# Patient Record
Sex: FEMALE | Race: BLACK OR AFRICAN AMERICAN | NOT HISPANIC OR LATINO
[De-identification: names, ages, dates, MRNs, and addresses within clinical notes are randomized per-mention and may not be internally consistent; named-entity substitution may affect disease eponyms.]

---

## 2018-05-21 ENCOUNTER — APPOINTMENT (OUTPATIENT)
Dept: OBGYN | Facility: CLINIC | Age: 58
End: 2018-05-21
Payer: COMMERCIAL

## 2018-05-21 VITALS
HEIGHT: 61 IN | SYSTOLIC BLOOD PRESSURE: 134 MMHG | HEART RATE: 75 BPM | DIASTOLIC BLOOD PRESSURE: 78 MMHG | WEIGHT: 112 LBS | BODY MASS INDEX: 21.14 KG/M2

## 2018-05-21 DIAGNOSIS — Z78.9 OTHER SPECIFIED HEALTH STATUS: ICD-10-CM

## 2018-05-21 DIAGNOSIS — Z82.49 FAMILY HISTORY OF ISCHEMIC HEART DISEASE AND OTHER DISEASES OF THE CIRCULATORY SYSTEM: ICD-10-CM

## 2018-05-21 DIAGNOSIS — Z00.00 ENCOUNTER FOR GENERAL ADULT MEDICAL EXAMINATION W/OUT ABNORMAL FINDINGS: ICD-10-CM

## 2018-05-21 DIAGNOSIS — Z83.3 FAMILY HISTORY OF DIABETES MELLITUS: ICD-10-CM

## 2018-05-21 DIAGNOSIS — N20.0 CALCULUS OF KIDNEY: ICD-10-CM

## 2018-05-21 DIAGNOSIS — Z80.9 FAMILY HISTORY OF MALIGNANT NEOPLASM, UNSPECIFIED: ICD-10-CM

## 2018-05-21 PROCEDURE — 99396 PREV VISIT EST AGE 40-64: CPT

## 2018-06-01 LAB
C TRACH RRNA SPEC QL NAA+PROBE: NOT DETECTED
CYTOLOGY CVX/VAG DOC THIN PREP: NORMAL
HPV HIGH+LOW RISK DNA PNL CVX: NOT DETECTED
N GONORRHOEA RRNA SPEC QL NAA+PROBE: NOT DETECTED
SOURCE TP AMPLIFICATION: NORMAL

## 2018-06-13 ENCOUNTER — OTHER (OUTPATIENT)
Age: 58
End: 2018-06-13

## 2018-06-18 ENCOUNTER — APPOINTMENT (OUTPATIENT)
Dept: OBGYN | Facility: CLINIC | Age: 58
End: 2018-06-18
Payer: COMMERCIAL

## 2018-06-18 PROCEDURE — 99213 OFFICE O/P EST LOW 20 MIN: CPT

## 2018-06-21 ENCOUNTER — RECORD ABSTRACTING (OUTPATIENT)
Age: 58
End: 2018-06-21

## 2018-06-21 ENCOUNTER — APPOINTMENT (OUTPATIENT)
Dept: CARDIOLOGY | Facility: CLINIC | Age: 58
End: 2018-06-21
Payer: COMMERCIAL

## 2018-06-21 VITALS
DIASTOLIC BLOOD PRESSURE: 74 MMHG | HEIGHT: 61 IN | HEART RATE: 66 BPM | WEIGHT: 112 LBS | BODY MASS INDEX: 21.14 KG/M2 | SYSTOLIC BLOOD PRESSURE: 124 MMHG | RESPIRATION RATE: 12 BRPM

## 2018-06-21 DIAGNOSIS — R01.1 CARDIAC MURMUR, UNSPECIFIED: ICD-10-CM

## 2018-06-21 DIAGNOSIS — Z82.3 FAMILY HISTORY OF STROKE: ICD-10-CM

## 2018-06-21 PROCEDURE — 93000 ELECTROCARDIOGRAM COMPLETE: CPT

## 2018-06-21 PROCEDURE — 99244 OFF/OP CNSLTJ NEW/EST MOD 40: CPT

## 2018-12-10 ENCOUNTER — APPOINTMENT (OUTPATIENT)
Dept: CARDIOLOGY | Facility: CLINIC | Age: 58
End: 2018-12-10

## 2018-12-13 ENCOUNTER — APPOINTMENT (OUTPATIENT)
Dept: CARDIOLOGY | Facility: CLINIC | Age: 58
End: 2018-12-13
Payer: COMMERCIAL

## 2018-12-13 VITALS
OXYGEN SATURATION: 100 % | DIASTOLIC BLOOD PRESSURE: 82 MMHG | RESPIRATION RATE: 12 BRPM | HEART RATE: 62 BPM | HEIGHT: 61 IN | SYSTOLIC BLOOD PRESSURE: 178 MMHG | BODY MASS INDEX: 21.34 KG/M2 | WEIGHT: 113 LBS

## 2018-12-13 DIAGNOSIS — Z87.898 PERSONAL HISTORY OF OTHER SPECIFIED CONDITIONS: ICD-10-CM

## 2018-12-13 PROCEDURE — 99214 OFFICE O/P EST MOD 30 MIN: CPT

## 2018-12-13 PROCEDURE — 93000 ELECTROCARDIOGRAM COMPLETE: CPT

## 2018-12-13 NOTE — PHYSICAL EXAM
[General Appearance - In No Acute Distress] : no acute distress [Normal Conjunctiva] : the conjunctiva exhibited no abnormalities [Normal Oral Mucosa] : normal oral mucosa [Auscultation Breath Sounds / Voice Sounds] : lungs were clear to auscultation bilaterally [Normal Rate] : normal [Rhythm Regular] : regular [Normal S1] : normal S1 [Normal S2] : normal S2 [I] : a grade 1 [Abdomen Soft] : soft [Abdomen Tenderness] : non-tender [Abnormal Walk] : normal gait [Nail Clubbing] : no clubbing of the fingernails [Cyanosis, Localized] : no localized cyanosis [Skin Color & Pigmentation] : normal skin color and pigmentation [Oriented To Time, Place, And Person] : oriented to person, place, and time [Affect] : the affect was normal [FreeTextEntry1] : No JVD, no carotid bruits. [S3] : no S3 [S4] : no S4

## 2018-12-13 NOTE — ASSESSMENT
[FreeTextEntry1] : EKG: Sinus rhythm with no significant ST or T wave changes.\par \par 58-year-old female with a past medical history of moderate mitral and tricuspid regurgitation with mild pulmonary hypertension, dyslipidemia, family history of stroke who presents for followup evaluation. Patient remains asymptomatic at this time. She did not yet have repeat echocardiogram or the carotid I have suggested but will have them done. Blood pressures is a bit elevated here today but she says it has never been high and was not high when she was here previously. It should be followed at home.

## 2018-12-13 NOTE — HISTORY OF PRESENT ILLNESS
[FreeTextEntry1] : Patient returns to the office today feeling well and offers no complaints. She is planning to retire in a week. She remains active although she does no concerted exercise. She has no limitations in her activity.   Patient denies chest pain, shortness of breath, palpitations, orthopnea, presyncope, syncope.

## 2018-12-13 NOTE — DISCUSSION/SUMMARY
[FreeTextEntry1] : 1. Check echocardiogram to reevaluate her valvular disease.\par 2. Check carotid Doppler given family history of stroke and dyslipidemia.\par 3. Monitor BP at home, keep a log and bring to f/u.\par 4. Continue simvastatin for dyslipidemia.\par 5. Patient is encouraged to exercise at least 30 minutes a day everyday of the week.\par 6. Will discuss results of testing over the phone and review her blood pressures at that time with planned followup in the office in 6 months.

## 2019-01-10 ENCOUNTER — APPOINTMENT (OUTPATIENT)
Dept: OBGYN | Facility: CLINIC | Age: 59
End: 2019-01-10

## 2019-01-26 ENCOUNTER — APPOINTMENT (OUTPATIENT)
Dept: CARDIOLOGY | Facility: CLINIC | Age: 59
End: 2019-01-26
Payer: COMMERCIAL

## 2019-01-26 PROCEDURE — 93880 EXTRACRANIAL BILAT STUDY: CPT

## 2019-01-26 PROCEDURE — 93306 TTE W/DOPPLER COMPLETE: CPT

## 2019-06-04 ENCOUNTER — APPOINTMENT (OUTPATIENT)
Dept: CARDIOLOGY | Facility: CLINIC | Age: 59
End: 2019-06-04
Payer: COMMERCIAL

## 2019-06-04 VITALS
HEIGHT: 61 IN | WEIGHT: 113 LBS | SYSTOLIC BLOOD PRESSURE: 157 MMHG | BODY MASS INDEX: 21.34 KG/M2 | DIASTOLIC BLOOD PRESSURE: 77 MMHG | HEART RATE: 72 BPM | RESPIRATION RATE: 14 BRPM | OXYGEN SATURATION: 100 %

## 2019-06-04 VITALS — DIASTOLIC BLOOD PRESSURE: 78 MMHG | SYSTOLIC BLOOD PRESSURE: 130 MMHG

## 2019-06-04 PROCEDURE — 99214 OFFICE O/P EST MOD 30 MIN: CPT

## 2019-06-04 PROCEDURE — 93000 ELECTROCARDIOGRAM COMPLETE: CPT

## 2019-06-04 RX ORDER — SIMVASTATIN 10 MG/1
10 TABLET, FILM COATED ORAL
Refills: 0 | Status: DISCONTINUED | COMMUNITY
End: 2019-06-04

## 2019-06-04 RX ORDER — CICLOPIROX OLAMINE 7.7 MG/ML
0.77 SUSPENSION TOPICAL
Qty: 60 | Refills: 0 | Status: DISCONTINUED | COMMUNITY
Start: 2018-11-28 | End: 2019-06-04

## 2019-06-04 NOTE — PHYSICAL EXAM
[General Appearance - In No Acute Distress] : no acute distress [Normal Conjunctiva] : the conjunctiva exhibited no abnormalities [Auscultation Breath Sounds / Voice Sounds] : lungs were clear to auscultation bilaterally [Normal Oral Mucosa] : normal oral mucosa [Rhythm Regular] : regular [Normal Rate] : normal [Normal S1] : normal S1 [Normal S2] : normal S2 [I] : a grade 1 [Abdomen Soft] : soft [Abdomen Tenderness] : non-tender [Cyanosis, Localized] : no localized cyanosis [Abnormal Walk] : normal gait [Nail Clubbing] : no clubbing of the fingernails [Oriented To Time, Place, And Person] : oriented to person, place, and time [Skin Color & Pigmentation] : normal skin color and pigmentation [Affect] : the affect was normal [S4] : no S4 [S3] : no S3 [FreeTextEntry1] : No JVD, no carotid bruits.

## 2019-06-04 NOTE — DISCUSSION/SUMMARY
[FreeTextEntry1] : 1. No additional cardiac testing at this time.\par 2. Continue simvastatin at increased dose for dyslipidemia.\par 3. Monitor BP at home, keep a log and bring to f/u. Will not add any medication for now.\par 4. Encourage her to improve her diet habits.\par 5. Repeat blood work in 3 months.\par 6. Patient is encouraged to exercise at least 30 minutes a day everyday of the week.\par 7. Follow up here in four months.\par

## 2019-06-04 NOTE — ASSESSMENT
[FreeTextEntry1] : EKG: Sinus rhythm with no significant ST or T wave changes.\par \par Echocardiogram January 26, 2019 demonstrated left ventricle normal in size and function with ejection fraction of 55-60%. Moderate mitral regurgitation was noted with mild tricuspid regurgitation. No pulmonary attention seen. Left atrium is borderline dilated.\par \par Carotid Dopplers January 26, 2019 demonstrated mild plaque on the left, minimal on the right at the bulbs with no significant stenosis bilaterally.\par \par 58-year-old female with a past medical history of moderate mitral and tricuspid regurgitation with mild pulmonary hypertension, dyslipidemia, family history of stroke who presents for followup evaluation. The patient remains asymptomatic. Echocardiogram shows stable moderate regurgitation with no evidence of significant pulmonary hypertension and only borderline dilated left atrium. Carotid Doppler is minimal to mild. I did increase her simvastatin at that time however her lipids have a little bit. She blames this on very poor diet. She would like to try and make some or changes before considering additional medication. Blood pressure was initially elevated today but came down and she says has been controlled in other offices. She needs to get a new monitor and check more at home.

## 2019-06-04 NOTE — HISTORY OF PRESENT ILLNESS
[FreeTextEntry1] : Patient returns to office today feeling very well and offering no complaints. She has been checking her blood pressures at home with a wrist monitor. However she does not feel the monitor is accurate and has planned to get a new monitor. She continues to be very active exercising regularly without any physical limitations. Patient denies chest pain, shortness of breath, palpitations, orthopnea, presyncope, syncope.

## 2019-06-24 ENCOUNTER — APPOINTMENT (OUTPATIENT)
Dept: OBGYN | Facility: CLINIC | Age: 59
End: 2019-06-24
Payer: COMMERCIAL

## 2019-06-24 VITALS
DIASTOLIC BLOOD PRESSURE: 78 MMHG | HEIGHT: 61 IN | HEART RATE: 78 BPM | SYSTOLIC BLOOD PRESSURE: 132 MMHG | WEIGHT: 111 LBS | BODY MASS INDEX: 20.96 KG/M2

## 2019-06-24 DIAGNOSIS — N95.2 POSTMENOPAUSAL ATROPHIC VAGINITIS: ICD-10-CM

## 2019-06-24 DIAGNOSIS — R23.2 FLUSHING: ICD-10-CM

## 2019-06-24 PROCEDURE — 99214 OFFICE O/P EST MOD 30 MIN: CPT

## 2019-06-24 NOTE — PHYSICAL EXAM
[Awake] : awake [Alert] : alert [Acute Distress] : no acute distress [Mass] : breast mass [Nipple Discharge] : no nipple discharge [Axillary LAD] : no axillary lymphadenopathy [Breast Hypertrophy Of The Right Breast] : hypertrophy [Soft] : soft [Tender] : non tender [Oriented x3] : oriented to person, place, and time [Normal] : urethra [Atrophy] : atrophy [Cystocele] : no cystocele [Discharge] : no discharge [Dry Mucosa] : dry mucosa [No Bleeding] : there was no active vaginal bleeding [Absent] : absent [Uterine Adnexae] : were not tender and not enlarged

## 2019-06-24 NOTE — REVIEW OF SYSTEMS
[Discharge] : no discharge [Chest Pain] : no chest pain [Dec Hearing] : no hearing loss [Dyspnea] : no shortness of breath [Abdominal Pain] : no abdominal pain [Pelvic Pain] : no pelvic pain [Vomiting] : no vomiting [Frequency] : no frequency [Arthralgias] : no arthralgias [Breast Pain] : no breast pain [Headache] : no headache [Anxiety] : no anxiety [Deepening Voice] : no deepening of the voice [Easy Bleeding] : does not bleed easily [Nl] : Hematologic/Lymphatic

## 2019-06-24 NOTE — HISTORY OF PRESENT ILLNESS
[Hot Flashes] : hot flashes [Night Sweats] : night sweats [2/10] : described as 2/10 in severity [Headache] : no headache [Weight Change] : no weight change [Fatigue] : no fatigue [Irritability] : no irritability [Forgetfulness] : no forgetfulness [Loss of Libido] : no loss of libido [Depression] : no depression [Anxiety] : no anxiety [Hot Environment] : exacerbated by hot environment [Relaxation] : relieved by relaxation [Cool Environment] : relieved by cool environment

## 2019-06-26 LAB
C TRACH RRNA SPEC QL NAA+PROBE: NOT DETECTED
HPV HIGH+LOW RISK DNA PNL CVX: NOT DETECTED
N GONORRHOEA RRNA SPEC QL NAA+PROBE: NOT DETECTED
SOURCE TP AMPLIFICATION: NORMAL

## 2019-07-01 LAB — CYTOLOGY CVX/VAG DOC THIN PREP: NORMAL

## 2019-08-04 ENCOUNTER — FORM ENCOUNTER (OUTPATIENT)
Age: 59
End: 2019-08-04

## 2019-08-05 ENCOUNTER — APPOINTMENT (OUTPATIENT)
Dept: MAMMOGRAPHY | Facility: CLINIC | Age: 59
End: 2019-08-05
Payer: COMMERCIAL

## 2019-08-05 ENCOUNTER — OUTPATIENT (OUTPATIENT)
Dept: OUTPATIENT SERVICES | Facility: HOSPITAL | Age: 59
LOS: 1 days | End: 2019-08-05
Payer: COMMERCIAL

## 2019-08-05 DIAGNOSIS — Z12.31 ENCOUNTER FOR SCREENING MAMMOGRAM FOR MALIGNANT NEOPLASM OF BREAST: ICD-10-CM

## 2019-08-05 PROCEDURE — 77067 SCR MAMMO BI INCL CAD: CPT

## 2019-08-05 PROCEDURE — 77067 SCR MAMMO BI INCL CAD: CPT | Mod: 26

## 2019-08-05 PROCEDURE — 77063 BREAST TOMOSYNTHESIS BI: CPT

## 2019-08-05 PROCEDURE — 77063 BREAST TOMOSYNTHESIS BI: CPT | Mod: 26

## 2019-10-07 ENCOUNTER — APPOINTMENT (OUTPATIENT)
Dept: CARDIOLOGY | Facility: CLINIC | Age: 59
End: 2019-10-07
Payer: COMMERCIAL

## 2019-10-07 ENCOUNTER — NON-APPOINTMENT (OUTPATIENT)
Age: 59
End: 2019-10-07

## 2019-10-07 VITALS
BODY MASS INDEX: 21.52 KG/M2 | RESPIRATION RATE: 16 BRPM | HEIGHT: 61 IN | HEART RATE: 73 BPM | SYSTOLIC BLOOD PRESSURE: 161 MMHG | DIASTOLIC BLOOD PRESSURE: 83 MMHG | WEIGHT: 114 LBS

## 2019-10-07 DIAGNOSIS — I27.20 PULMONARY HYPERTENSION, UNSPECIFIED: ICD-10-CM

## 2019-10-07 PROCEDURE — 93000 ELECTROCARDIOGRAM COMPLETE: CPT

## 2019-10-07 PROCEDURE — 99214 OFFICE O/P EST MOD 30 MIN: CPT

## 2019-10-07 NOTE — HISTORY OF PRESENT ILLNESS
[FreeTextEntry1] : Patient comes to the office today feeling generally well. She is complaining of rare palpitations occurring at night when she lays on her left side. They are brief and resolved on their own and not associated with any other symptoms. She has been exercising without physical limitation. Blood pressures have ranged from the 110s to 120s over 70s to 80s at home. Patient denies chest pain, shortness of breath, orthopnea, presyncope, syncope.

## 2019-10-07 NOTE — ASSESSMENT
[FreeTextEntry1] : EKG: Sinus rhythm with mild nonspecific T wave changes.\par \par Echocardiogram January 26, 2019 demonstrated left ventricle normal in size and function with ejection fraction of 55-60%. Moderate mitral regurgitation was noted with mild tricuspid regurgitation. No pulmonary attention seen. Left atrium is borderline dilated.\par \par Carotid Dopplers January 26, 2019 demonstrated mild plaque on the left, minimal on the right at the bulbs with no significant stenosis bilaterally.\par \par 58-year-old female with a past medical history of moderate mitral and tricuspid regurgitation, dyslipidemia, family history of stroke who presents for followup evaluation. Patient is having some intermittent palpitations but he seemed to not be very significant or frequent at this time. She is otherwise asymptomatic and doing well. No evidence of heart failure. She is exercising without issue and maintains a healthy weak. Lipids have improved back in June on the increased dose of simvastatin. Blood pressures appear to be controlled at home.

## 2019-10-07 NOTE — PHYSICAL EXAM
[General Appearance - In No Acute Distress] : no acute distress [Normal Conjunctiva] : the conjunctiva exhibited no abnormalities [Normal Oral Mucosa] : normal oral mucosa [Auscultation Breath Sounds / Voice Sounds] : lungs were clear to auscultation bilaterally [Normal Rate] : normal [Normal S2] : normal S2 [Normal S1] : normal S1 [Rhythm Regular] : regular [Abdomen Soft] : soft [Abnormal Walk] : normal gait [Abdomen Tenderness] : non-tender [Nail Clubbing] : no clubbing of the fingernails [Skin Color & Pigmentation] : normal skin color and pigmentation [Cyanosis, Localized] : no localized cyanosis [Oriented To Time, Place, And Person] : oriented to person, place, and time [Affect] : the affect was normal [FreeTextEntry1] : No JVD, no carotid bruits. [S3] : no S3 [S4] : no S4 [II] : a grade 2

## 2019-10-07 NOTE — DISCUSSION/SUMMARY
[FreeTextEntry1] : 1. No additional cardiac testing at this time.\par 2. Continue simvastatin for dyslipidemia.\par 3. Monitor BP at home, keep a log and bring to f/u. Will not add any medication for now.\par 4. Patient is encouraged to exercise at least 30 minutes a day everyday of the week.\par 5. Patient encouraged to work on a healthy diet high in lean protein, whole grains and vegetables, and lower in white flour and simple sugars.\par 6. Follow up here in four months. Patient may move to [place] before that time. We will be available as needed and to provide any medical records.\par

## 2019-10-10 ENCOUNTER — RX RENEWAL (OUTPATIENT)
Age: 59
End: 2019-10-10

## 2019-10-10 RX ORDER — SIMVASTATIN 20 MG/1
20 TABLET, FILM COATED ORAL
Qty: 30 | Refills: 5 | Status: ACTIVE | COMMUNITY
Start: 2019-03-05 | End: 1900-01-01

## 2020-01-30 ENCOUNTER — APPOINTMENT (OUTPATIENT)
Dept: CARDIOLOGY | Facility: CLINIC | Age: 60
End: 2020-01-30
Payer: COMMERCIAL

## 2020-01-30 ENCOUNTER — NON-APPOINTMENT (OUTPATIENT)
Age: 60
End: 2020-01-30

## 2020-01-30 VITALS
BODY MASS INDEX: 22.47 KG/M2 | HEART RATE: 78 BPM | WEIGHT: 119 LBS | SYSTOLIC BLOOD PRESSURE: 154 MMHG | DIASTOLIC BLOOD PRESSURE: 90 MMHG | HEIGHT: 61 IN | RESPIRATION RATE: 16 BRPM | OXYGEN SATURATION: 99 %

## 2020-01-30 DIAGNOSIS — I34.0 NONRHEUMATIC MITRAL (VALVE) INSUFFICIENCY: ICD-10-CM

## 2020-01-30 DIAGNOSIS — I07.1 RHEUMATIC TRICUSPID INSUFFICIENCY: ICD-10-CM

## 2020-01-30 DIAGNOSIS — E78.5 HYPERLIPIDEMIA, UNSPECIFIED: ICD-10-CM

## 2020-01-30 DIAGNOSIS — R03.0 ELEVATED BLOOD-PRESSURE READING, W/OUT DIAGNOSIS OF HYPERTENSION: ICD-10-CM

## 2020-01-30 PROCEDURE — 93000 ELECTROCARDIOGRAM COMPLETE: CPT

## 2020-01-30 PROCEDURE — 99214 OFFICE O/P EST MOD 30 MIN: CPT

## 2020-01-30 NOTE — PHYSICAL EXAM
[Normal Conjunctiva] : the conjunctiva exhibited no abnormalities [General Appearance - In No Acute Distress] : no acute distress [Normal Oral Mucosa] : normal oral mucosa [Auscultation Breath Sounds / Voice Sounds] : lungs were clear to auscultation bilaterally [Rhythm Regular] : regular [Normal Rate] : normal [Normal S1] : normal S1 [Normal S2] : normal S2 [II] : a grade 2 [Abdomen Soft] : soft [Abdomen Tenderness] : non-tender [Abnormal Walk] : normal gait [Nail Clubbing] : no clubbing of the fingernails [Cyanosis, Localized] : no localized cyanosis [Skin Color & Pigmentation] : normal skin color and pigmentation [Oriented To Time, Place, And Person] : oriented to person, place, and time [Affect] : the affect was normal [FreeTextEntry1] : No JVD, no carotid bruits. [S3] : no S3 [S4] : no S4

## 2020-01-30 NOTE — HISTORY OF PRESENT ILLNESS
[FreeTextEntry1] : Patient returns to the office today feeling very well and offering no complaints. She says she had maybe one episode of palpitations and left sore but this has not bothered her at all. She did not bring her list with her here today but reports blood pressures generally in the 120s to 130s over 70s. She is planning to move to [place] this weekend but will be back a couple of times in the next couple of months. She does have some names of doctors and plans to establish care when she arrives. She had a colonoscopy in November which was unremarkable. She is otherwise doing well. Patient denies chest pain, shortness of breath, palpitations, orthopnea, presyncope, syncope.

## 2020-01-30 NOTE — DISCUSSION/SUMMARY
[FreeTextEntry1] : 1. No additional cardiac testing at this time.\par 2. Continue simvastatin for dyslipidemia.\par 3. Monitor BP at home, keep a log and bring to f/u. Will not add any medication for now.\par 4. Patient is encouraged to exercise at least 30 minutes a day everyday of the week.\par 5. Patient encouraged to work on a healthy diet high in lean protein, whole grains and vegetables, and lower in white flour and simple sugars.\par 6. Patient moving to [place] before that time. We will be available as needed and to provide any medical records.\par

## 2020-01-30 NOTE — ASSESSMENT
[FreeTextEntry1] : Echocardiogram January 26, 2019 demonstrated left ventricle normal in size and function with ejection fraction of 55-60%. Moderate mitral regurgitation was noted with mild tricuspid regurgitation. No pulmonary attention seen. Left atrium is borderline dilated.\par \par Carotid Doppler January 26, 2019 demonstrated mild plaque on the left, minimal on the right at the bulbs with no significant stenosis bilaterally.\par \par EKG: Sinus rhythm with no significant ST or T wave changes.\par \par 59-year-old female with a past medical history of moderate mitral and tricuspid regurgitation, dyslipidemia, family history of stroke who presents for followup evaluation. Patient is doing very well at this time with no further significant palpitations. Blood pressure continues to be somewhat borderline but given that she is moving to [place] this weekend I will not make any changes for now. I encouraged her to establish care and to bring her list of readings when she goes. She continues to tolerate her simvastatin and I will continue her on that dose. She likely should have repeat echocardiogram but I will defer to any doctor that she establishes care with when she moves. She is stable from a cardiac standpoint at this time.